# Patient Record
Sex: FEMALE | URBAN - METROPOLITAN AREA
[De-identification: names, ages, dates, MRNs, and addresses within clinical notes are randomized per-mention and may not be internally consistent; named-entity substitution may affect disease eponyms.]

---

## 2022-09-26 ENCOUNTER — APPOINTMENT (OUTPATIENT)
Age: 27
Setting detail: DERMATOLOGY
End: 2022-09-26

## 2022-09-26 VITALS — TEMPERATURE: 97.9 F

## 2022-09-26 DIAGNOSIS — L21.8 OTHER SEBORRHEIC DERMATITIS: ICD-10-CM

## 2022-09-26 PROCEDURE — OTHER MIPS QUALITY: OTHER

## 2022-09-26 PROCEDURE — OTHER COUNSELING: OTHER

## 2022-09-26 PROCEDURE — OTHER TREATMENT REGIMEN: OTHER

## 2022-09-26 PROCEDURE — OTHER PRESCRIPTION: OTHER

## 2022-09-26 PROCEDURE — 99203 OFFICE O/P NEW LOW 30 MIN: CPT

## 2022-09-26 RX ORDER — KETOCONAZOLE 20 MG/ML
SHAMPOO, SUSPENSION TOPICAL
Qty: 120 | Refills: 11 | Status: ERX | COMMUNITY
Start: 2022-09-26

## 2022-09-26 RX ORDER — CLOBETASOL PROPIONATE 0.5 MG/ML
SOLUTION TOPICAL
Qty: 50 | Refills: 11 | Status: ERX | COMMUNITY
Start: 2022-09-26

## 2022-09-26 ASSESSMENT — LOCATION DETAILED DESCRIPTION DERM
LOCATION DETAILED: RIGHT CENTRAL PARIETAL SCALP
LOCATION DETAILED: LEFT CENTRAL PARIETAL SCALP

## 2022-09-26 ASSESSMENT — LOCATION SIMPLE DESCRIPTION DERM: LOCATION SIMPLE: SCALP

## 2022-09-26 ASSESSMENT — LOCATION ZONE DERM: LOCATION ZONE: SCALP

## 2022-09-26 NOTE — PROCEDURE: TREATMENT REGIMEN
Detail Level: Zone
Initiate Treatment: -ketoconazole 2 % shampoo; Lather up scalp, let soak 3-5 min before rinsing, use at least 4 to 5 times a week until well controlled, then use 1-2x a week as maintenance\\n-clobetasol 0.05 % scalp solution; apply dime sized amount to affected itchy areas on scalp bid x2 weeks with 1 week break in between, then use PRN flares. Do not use on face.